# Patient Record
Sex: FEMALE | URBAN - METROPOLITAN AREA
[De-identification: names, ages, dates, MRNs, and addresses within clinical notes are randomized per-mention and may not be internally consistent; named-entity substitution may affect disease eponyms.]

---

## 2020-08-17 ENCOUNTER — HOSPITAL ENCOUNTER (OUTPATIENT)
Dept: PHYSICAL THERAPY | Age: 60
Setting detail: THERAPIES SERIES
Discharge: HOME | End: 2020-08-17
Payer: COMMERCIAL

## 2020-08-17 PROCEDURE — 97161 PT EVAL LOW COMPLEX 20 MIN: CPT

## 2020-08-17 ASSESSMENT — PAIN SCALES - GENERAL: PAINLEVEL_OUTOF10: 2

## 2020-08-17 NOTE — FLOWSHEET NOTE
Physical Therapy Daily Treatment Note    Date:  2020    Patient Name:  Ronda Guzmán    :  1960  MRN: [id number]  Restrictions/Precautions:    Medical/Treatment Diagnosis Information:  · Diagnosis: lumbar DDD, spondylolisthesis  Insurance/Certification information:  PT Insurance Information: Charli  Physician Information:  Referring Practitioner: Dante Lo  Plan of care signed (Y/N):  Y  Visit# / total visits:       G-Code (if applicable): Modified Oswestry 30%      Time in:   2:30      Timed Treatment: 0  Total Treatment Time:  60  Time out: 3:30  ________________________________________________________________________________________    Pain Level:    /10  SUBJECTIVE:  See initial eval    OBJECTIVE:     Exercise/Equipment Resistance/Repetitions Other comments   TA activation with BP cuff NV    Clamshells  NV    Bridging NV    Stacking with MB NV    lumbopelvic stabilization   NV                                                                                                           Other Therapeutic Activities:      Manual Treatments: B LE pulling NV. QL STM with manual stretch followed by lumbar TP SB mobs. Grade 3-4 lumbar gapping. Thoracic distraction. Tracing L5-S1 nerve lower L LE.           Modalities:      Test/Measurements:         ASSESSMENT:         Treatment/Activity Tolerance:   []Patient tolerated treatment well [] Patient limited by fatique  []Patient limited by pain [] Patient limited by other medical complications  [] Other:     Goals:          Long term goals  Time Frame for Long term goals : 6 weeks  Long term goal 1: Pt independent with HEP  Long term goal 2: Pt improve muscle strength by 1/3 muscle grade  Long term goal 3: Pt able to walk 30 mins without limits to pain  Long term goal 4: Pt able to do house/yard work without limits to pain  Long term goal 5: Pt return to 100% PLOF     Plan: [] Continue per plan of care [] Alter current plan (see comments)   [x] Plan of care initiated [] Hold pending MD visit [] Discharge      Plan for Next Session:      Re-Certification Due Date:         Signature:  Kendell Sibley PT

## 2020-08-17 NOTE — PROGRESS NOTES
Physical Therapy  Initial Assessment  Date: 2020  Patient Name: Mukesh Dominguez  MRN: [id number]  : 1960     Restrictions  Position Activity Restriction  Other position/activity restrictions: No lifting and no yardwork. Subjective   General  Chart Reviewed: Yes  Family / Caregiver Present: No  Referring Practitioner: Gato Horne  Referral Date : 20  Diagnosis: lumbar DDD, spondylolisthesis  Follows Commands: Within Functional Limits  General Comment  Comments: PLOF: completely independent. currently 60% PLOF. Goals: not limited with standing, walking, housework or yardwork. PT Visit Information  PT Insurance Information: Charli  Subjective  Subjective: Pt reports having injections in friday in B hips with good outcomes. Dr Trenton Sutton with spinal stenosis that has gotten worse over the past couple years. Standing limited to 1 hr before limited to pain in hips more than back and standing is the worst for pain. when sitting to standing pain subsides quickly. Housework also increases pain. Doc said lifting restrictions and no yardwork. Sitting greater than 1 hour causes N/T on the lateral L lower leg. pain reports same B. denies bowel/bladder. low back MRI + spinal stenosis xray + for bursitis. sleeping/laying on L side is more comfortable than the other. Walking limited to 20 minutes before pain, golfing is okay but pain sets in in last few holes. no noted weakness B LEs. Pain Screening  Patient Currently in Pain: Yes  Pain Assessment  Pain Assessment: 0-10  Pain Level: 2(at worst 7-8/10 in back and hips.)  Vital Signs  Patient Currently in Pain: Yes    Objective     Observation/Palpation  Posture: Fair  Palpation: Tenderness B PSIS  Observation: Standing: L pes planus and ankle valgus, L knee hyperextension, L hip forward rotation. Hinge at L3. B scapular AT, IR, depression and downward rotation  Body Mechanics: Squat: B knee valgus, quad dominance, R weight shift, B hip IR.  L SLS: ipsilateral lateral trunk shift. R SLS: trendelenburg. Single leg squat: B hip IR, knee valgus, and trendelenburg with R worse than L. Gait: lumbar ERS, increased pes planus B with L worse than R, B hip IR, L heel whip. PROM RLE (degrees)  RLE PROM: WNL  PROM LLE (degrees)  LLE PROM: WNL  Spine  Lumbar: Hypomobility L3/L4 and L4/L5  Joint Mobility  Spine: full AROM lumbar spine with pain in FF, B SB and extension at L5 level. Strength RLE  Strength RLE: WNL  Comment: Hip ER 4/5; Hip IR 4/5 PGM 3-/5  Strength LLE  Strength LLE: WNL  Comment: Hip ER 4-/5; Hip IR 4/5 PGM 3-/5     Additional Measures  Other: Sensation to pin prick WNL B LEs. DTR WNL B LE. Denies B/B.  (-) babinski, (-) clonus. (-) slump test B. Assessment   Conditions Requiring Skilled Therapeutic Intervention  Body structures, Functions, Activity limitations: Decreased functional mobility ; Increased pain;Decreased posture;Decreased ROM; Decreased strength  Assessment: Pt presents with movement impairment lumbar ERS when walking. Lack of gluteal and core activation in standing and in gait increase stress on ligamentous structures and posterior vertebrae. Pt presents with a lumbar hinge at L3 and hypomobile L3/L4 segments.   Prognosis: Good  Decision Making: Low Complexity  REQUIRES PT FOLLOW UP: Yes         Plan   Plan  Times per week: 2x/week for 6 weeks  Current Treatment Recommendations: Strengthening, IADL Training, Neuromuscular Re-education, Home Exercise Program, ROM, Manual Therapy - Soft Tissue Mobilization, Endurance Training, Patient/Caregiver Education & Training, Gait Training, Manual Therapy - Joint Manipulation, Functional Mobility Training, ADL/Self-care Training, Pain Management    G-Code   Modified Oswestry  30%    Goals  Long term goals  Time Frame for Long term goals : 6 weeks  Long term goal 1: Pt independent with HEP  Long term goal 2: Pt improve muscle strength by 1/3 muscle grade  Long term goal 3: Pt able to walk 30 mins without limits to pain  Long term goal 4: Pt able to do house/yard work without limits to pain  Long term goal 5: Pt return to 100% PLOF  Patient Goals   Patient goals : 100% PLOF, able to walk 30 minutes without limits to pain. able to do house/yard work without limits to pain, able to stand greater than 1 hr without limits to pain.        Therapy Time   Individual Concurrent Group Co-treatment   Time In  2:30         Time Out 3:30         Minutes 0                 Charli Mini, PT

## 2020-08-21 ENCOUNTER — HOSPITAL ENCOUNTER (OUTPATIENT)
Dept: PHYSICAL THERAPY | Age: 60
Setting detail: THERAPIES SERIES
Discharge: HOME | End: 2020-08-21
Payer: COMMERCIAL

## 2020-08-21 PROCEDURE — 97110 THERAPEUTIC EXERCISES: CPT

## 2020-08-21 PROCEDURE — 97140 MANUAL THERAPY 1/> REGIONS: CPT

## 2020-08-21 NOTE — FLOWSHEET NOTE
Physical Therapy Daily Treatment Note    Date:  2020    Patient Name:  Jo Ann Patterson    :  1960  MRN: [id number]  Restrictions/Precautions:    Medical/Treatment Diagnosis Information:  · Diagnosis: lumbar DDD, spondylolisthesis  Insurance/Certification information:  PT Insurance Information: Charli  Physician Information:  Referring Practitioner: Neville Vance  Plan of care signed (Y/N):  Y  Visit# / total visits:       G-Code (if applicable): Modified Oswestry 30%      Time in:   1:45     Timed Treatment: 45  Total Treatment Time:  45  Time out: 2:30  ________________________________________________________________________________________    Pain Level:    3/10  SUBJECTIVE:  Pt states that she has been focusing on adjusting posture since last visit and notices she is able to stand longer. OBJECTIVE:     Exercise/Equipment Resistance/Repetitions Other comments   TA activation with BP cuff x5 mins    Clamshells  6x5 sec HEP   Bridging [id number] HEP   Stacking with MB NV    lumbopelvic stabilization   x5 mins                                                                                                           Other Therapeutic Activities:      Manual Treatments: B LE pulling. QL STM with manual stretch followed by lumbar TP SB mobs. Grade 2-3 general L lumbar gapping. Grade 3-4 L lumbar gapping with and without cavitation. Thoracic distraction gr 5 with cavitation. Tracing L5-S1 nerve lower L LE.   Grade 4 fibular head mob. Gr 4 L tibial IR mobs followed by gr 4 M/L tibio-femoral joint mobs. Sural nerve tracing and flossing L LE. Modalities:    Decreased L tibial IR  Hypomobility L fibular head     Test/Measurements:         ASSESSMENT:  Pt responded well to manual therapy with decreased pain and increased ROM. Initiated core and gluteal strengthening this visit and pt tolerated exercises well.  Continue to progress stabilization exercises and initiate standing stabilization exercises NV.         Treatment/Activity Tolerance:   [x]Patient tolerated treatment well [] Patient limited by fatique  []Patient limited by pain [] Patient limited by other medical complications  [] Other:     Goals:          Long term goals  Time Frame for Long term goals : 6 weeks  Long term goal 1: Pt independent with HEP  Long term goal 2: Pt improve muscle strength by 1/3 muscle grade  Long term goal 3: Pt able to walk 30 mins without limits to pain  Long term goal 4: Pt able to do house/yard work without limits to pain  Long term goal 5: Pt return to 100% PLOF     Plan: [x] Continue per plan of care [] Alter current plan (see comments)   [] Plan of care initiated [] Hold pending MD visit [] Discharge      Plan for Next Session:      Re-Certification Due Date:         Signature:  Gibran Mckeon PT

## 2020-08-26 ENCOUNTER — HOSPITAL ENCOUNTER (OUTPATIENT)
Dept: PHYSICAL THERAPY | Age: 60
Setting detail: THERAPIES SERIES
Discharge: HOME | End: 2020-08-26
Payer: COMMERCIAL

## 2020-08-26 PROCEDURE — 97110 THERAPEUTIC EXERCISES: CPT

## 2020-08-26 PROCEDURE — 97140 MANUAL THERAPY 1/> REGIONS: CPT

## 2020-08-26 NOTE — FLOWSHEET NOTE
Physical Therapy Daily Treatment Note    Date:  2020    Patient Name:  Barbara Stephens    :  1960  MRN: [id number]  Restrictions/Precautions:    Medical/Treatment Diagnosis Information:  · Diagnosis: lumbar DDD, spondylolisthesis  Insurance/Certification information:  PT Insurance Information: Charli  Physician Information:  Referring Practitioner: Ivan Ladd  Plan of care signed (Y/N):  Y  Visit# / total visits:  3/12     G-Code (if applicable): Modified Oswestry 30%      Time in:   12:58    Timed Treatment: 47  Total Treatment Time:  47  Time out: 1:45  ________________________________________________________________________________________    Pain Level:    1-210  SUBJECTIVE:  Pt states feeling better after last session. Good consistency with HEP since then but golfed 9 holes in the am and then 9 holes in the afternoon. Soreness today in the low back. OBJECTIVE:     Exercise/Equipment Resistance/Repetitions Other comments   TA activation with BP cuff        With march         With KFO  Until achieved  x10 B   x10 B  HEP video    Clamshells 2 6x5 sec HEP   Bridging [id number] HEP   Stacking with MB 2x30 secs B    lumbopelvic stabilization   x5 mins                                TG      x6 mins                                                                       Other Therapeutic Activities:      Manual Treatments: B LE pulling. QL STM with manual stretch followed by lumbar TP SB mobs. Grade 2-3 general L lumbar gapping. Grade 3-4 L lumbar gapping with and without cavitation. Thoracic distraction gr 5 with cavitation. Tracing L5-S1 nerve lower L LE.   Grade 4 fibular head mob. Gr 4 L tibial IR mobs followed by gr 4 M/L tibio-femoral joint mobs. Sural nerve tracing and flossing L LE. Modalities:    Decreased L tibial IR  LLD L longer than R   R AI     Test/Measurements:         ASSESSMENT:  Pt responded well to manual therapy with decreased pain and increased ROM.

## 2020-08-28 ENCOUNTER — HOSPITAL ENCOUNTER (OUTPATIENT)
Dept: PHYSICAL THERAPY | Age: 60
Setting detail: THERAPIES SERIES
Discharge: HOME | End: 2020-08-28
Payer: COMMERCIAL

## 2020-08-28 PROCEDURE — 97110 THERAPEUTIC EXERCISES: CPT

## 2020-08-28 PROCEDURE — 97112 NEUROMUSCULAR REEDUCATION: CPT

## 2020-08-28 NOTE — FLOWSHEET NOTE
Physical Therapy Daily Treatment Note    Date:  2020    Patient Name:  Merlin Patterson    :  1960  MRN: [id number]  Restrictions/Precautions:    Medical/Treatment Diagnosis Information:  · Diagnosis: lumbar DDD, spondylolisthesis  Insurance/Certification information:  PT Insurance Information: Charli  Physician Information:  Referring Practitioner: Jeanette Barrett  Plan of care signed (Y/N):  Y  Visit# / total visits:       G-Code (if applicable): Modified Oswestry 30%      Time in:   1:00   Timed Treatment: 45  Total Treatment Time:  45  Time out: 1:45  ________________________________________________________________________________________    Pain Level:    2-3/10  SUBJECTIVE:  Pt states feeling good with only mild discomfort after playing 18 holes of golf yesterday. OBJECTIVE:     Exercise/Equipment Resistance/Repetitions Other comments   TA activation with BP cuff        With march         With KFO   HEP video    Clamshells 2  HEP   Bridging  HEP   Stacking with MB 2x30 secs B    lumbopelvic stabilization       Lunges with anterior anchor   x15 orange SC     Standing S' extension   x15 maroon TB     Multifidus  x15 maroon TB     Rockerboard  x1 min F/B M/L  x1 min rocking each     TG      x6 mins             Hip abd     Hip ext 15# 2x15 B   40# x15 B            Standing hip ER with green TB and S' horiz abd x10 B with blue TB                                          Other Therapeutic Activities:      Manual Treatments:       Modalities:    Decreased L tibial IR  LLD L longer than R   R AI     Test/Measurements:         ASSESSMENT:   Progressed core and gluteal strengthening in standing this visit.         Treatment/Activity Tolerance:   [x]Patient tolerated treatment well [] Patient limited by fatique  []Patient limited by pain [] Patient limited by other medical complications  [] Other:     Goals:        Long term goals  Time Frame for Long term goals : 6 weeks  Long term goal

## 2020-08-31 ENCOUNTER — HOSPITAL ENCOUNTER (OUTPATIENT)
Dept: PHYSICAL THERAPY | Age: 60
Setting detail: THERAPIES SERIES
Discharge: HOME | End: 2020-08-31
Payer: COMMERCIAL

## 2020-08-31 PROCEDURE — 97110 THERAPEUTIC EXERCISES: CPT

## 2020-08-31 PROCEDURE — 97112 NEUROMUSCULAR REEDUCATION: CPT

## 2020-08-31 NOTE — FLOWSHEET NOTE
Physical Therapy Daily Treatment Note    Date:  2020    Patient Name:  Eugenio Gonzalez    :  1960  MRN: [id number]  Restrictions/Precautions:    Medical/Treatment Diagnosis Information:  · Diagnosis: lumbar DDD, spondylolisthesis  Insurance/Certification information:  PT Insurance Information: Charli  Physician Information:  Referring Practitioner: Alex Garrison  Plan of care signed (Y/N):  Y  Visit# / total visits:       G-Code (if applicable): Modified Oswestry 30%    Time in:   1:00   Timed Treatment: 45  Total Treatment Time:  45  Time out: 1:45  ________________________________________________________________________________________    Pain Level:    1/10  SUBJECTIVE:  Pt states she is feeling much better without pain over the weekend. OBJECTIVE:     Exercise/Equipment Resistance/Repetitions Other comments   TA activation with BP cuff        With march         With KFO   HEP video    Clamshells 2  HEP   Bridging  HEP   Stacking with MB 2x30 secs B    lumbopelvic stabilization       Lunges with anterior anchor   x15 orange SC     Standing S' extension with Hip ER x15 maroon TB  Green TB    Multifidus with Hip ER  x15 maroon TB  Green TB    Rockerboard  x1 min F/B M/L  x1 min rocking each     TG      x6 mins     Posterior sling     x15 B maroon TB 4\" step    Lateral sling x15 B maroon TB     Hip abd     Hip ext 20# 2x15 B   45# x15 B            Standing hip ER with green TB and S' horiz abd x10 B with blue TB     Lateral stepping with grey TB        Hip abd steamboats   6x10 ft  x15 B                                  Other Therapeutic Activities:      Manual Treatments:       Modalities:        Test/Measurements:         ASSESSMENT:   Progressed core and gluteal strengthening in standing this visit.         Treatment/Activity Tolerance:   [x]Patient tolerated treatment well [] Patient limited by fatique  []Patient limited by pain [] Patient limited by other medical complications  [] Other:     Goals:        Long term goals  Time Frame for Long term goals : 6 weeks  Long term goal 1: Pt independent with HEP  Long term goal 2: Pt improve muscle strength by 1/3 muscle grade  Long term goal 3: Pt able to walk 30 mins without limits to pain  Long term goal 4: Pt able to do house/yard work without limits to pain  Long term goal 5: Pt return to 100% PLOF     Plan: [x] Continue per plan of care [] Alter current plan (see comments)   [] Plan of care initiated [] Hold pending MD visit [] Discharge      Plan for Next Session:      Re-Certification Due Date:         Signature:  Reji Tavarez , PT

## 2020-09-03 ENCOUNTER — HOSPITAL ENCOUNTER (OUTPATIENT)
Dept: PHYSICAL THERAPY | Age: 60
Setting detail: THERAPIES SERIES
Discharge: HOME | End: 2020-09-03
Payer: COMMERCIAL

## 2020-09-03 PROCEDURE — 97112 NEUROMUSCULAR REEDUCATION: CPT

## 2020-09-03 PROCEDURE — 97110 THERAPEUTIC EXERCISES: CPT

## 2020-09-03 NOTE — FLOWSHEET NOTE
Physical Therapy Daily Treatment Note    Date:  9/3/2020    Patient Name:  Ronda Guzmán    :  1960  MRN: [id number]  Restrictions/Precautions:    Medical/Treatment Diagnosis Information:  · Diagnosis: lumbar DDD, spondylolisthesis  Insurance/Certification information:  PT Insurance Information: Charli  Physician Information:  Referring Practitioner: Dante Lo  Plan of care signed (Y/N):  Y  Visit# / total visits:       G-Code (if applicable): Modified Oswestry     9/3/20  12% disability    At initial evaluation 30%    Time in:   12:45   Timed Treatment: 45  Total Treatment Time:  45  Time out: 1:30  ________________________________________________________________________________________    Pain Level:    1/10  SUBJECTIVE:  Pt states she is feeling much better without pain over the weekend. Pt has one visit next week then off PT for 2 weeks. OBJECTIVE:     Exercise/Equipment Resistance/Repetitions Other comments   TA activation with BP cuff        With march         With KFO   HEP video    Clamshells 2  HEP   Bridging  HEP   Stacking with MB     lumbopelvic stabilization       Lunges with anterior anchor   x15 yellow SC     Standing S' extension with Hip ER  Green TB    Multifidus with Hip ER   Green TB    Rockerboard  x1 min F/B M/L  x1 min rocking each     TG      x6 mins     Posterior sling     x15 B maroon TB 4\" step    Lateral sling x15 B maroon TB     Hip abd     Hip ext 20#    45#    3 way hip with SC    x15 B     Standing hip ER with green TB and S' horiz abd      Lateral stepping with grey TB        Hip abd steamboats       Resisted walking with SC   x8 laps each direction [place] SC           [facility] standing EC        minisquat   x1 min   x10              Other Therapeutic Activities:      Manual Treatments:       Modalities:        Test/Measurements:         ASSESSMENT:   Progressed core and gluteal strengthening in standing this visit.         Treatment/Activity Tolerance:   [x]Patient tolerated treatment well [] Patient limited by fatique  []Patient limited by pain [] Patient limited by other medical complications  [] Other:     Goals:        Long term goals  Time Frame for Long term goals : 6 weeks  Long term goal 1: Pt independent with HEP  Long term goal 2: Pt improve muscle strength by 1/3 muscle grade  Long term goal 3: Pt able to walk 30 mins without limits to pain  Long term goal 4: Pt able to do house/yard work without limits to pain  Long term goal 5: Pt return to 100% PLOF     Plan: [x] Continue per plan of care [] Alter current plan (see comments)   [] Plan of care initiated [] Hold pending MD visit [] Discharge      Plan for Next Session:      Re-Certification Due Date:         Signature:  Kayce Curiel PT

## 2020-09-09 ENCOUNTER — HOSPITAL ENCOUNTER (OUTPATIENT)
Dept: PHYSICAL THERAPY | Age: 60
Setting detail: THERAPIES SERIES
Discharge: HOME | End: 2020-09-09
Payer: COMMERCIAL

## 2020-09-09 PROCEDURE — 97112 NEUROMUSCULAR REEDUCATION: CPT

## 2020-09-09 PROCEDURE — 97110 THERAPEUTIC EXERCISES: CPT

## 2020-09-09 NOTE — FLOWSHEET NOTE
Physical Therapy Daily Treatment Note    Date:  2020    Patient Name:  Cait Osman    :  1960  MRN: [id number]  Restrictions/Precautions:    Medical/Treatment Diagnosis Information:  · Diagnosis: lumbar DDD, spondylolisthesis  Insurance/Certification information:  PT Insurance Information: Charli  Physician Information:  Referring Practitioner: Salvador Patient  Plan of care signed (Y/N):  Y  Visit# / total visits:       G-Code (if applicable): Modified Oswestry     9/3/20  12% disability    At initial evaluation 30%    Time in:   1:00   Timed Treatment: 45  Total Treatment Time:  45  Time out: 1:45  ________________________________________________________________________________________    Pain Level:    0/10  SUBJECTIVE:  Pt states she is feeling much better without pain over the weekend. Pt states she has been golfing without pain. Pt holding therapy over the next couple of weeks for vacation.       OBJECTIVE:     Exercise/Equipment Resistance/Repetitions Other comments   TA activation with BP cuff        With march         With KFO   HEP video    Clamshells 2  HEP   Bridging  HEP   Stacking with MB     lumbopelvic stabilization       SLS with anterior anchor   4x15 secs B yellow SC  Mirror cueing   Standing S' extension with Hip ER  Green TB    Multifidus walkouts with green TB around ankles  x10 B     Rockerboard  x1 min F/B M/L  x1 min rocking each     TG      x6 mins     Low trap with yellow TB and hip ER with green TB  x15    Posterior sling         Lateral sling     Hip abd     Hip ext 20#    45#    3 way hip with SC    x15 B     Standing hip ER with green TB and S' horiz abd      Lateral stepping with grey TB        Hip abd steamboats       Resisted walking with SC   x8 laps each direction [place] SC    S' horiz abd with posterior anchor x15 B grey TB  [place] maroon TB    S' extension with posterior anchor x15 maroon TB  [place] maroon TB   [facility] standing EC        minisquat   x1 min   x10              Other Therapeutic Activities:      Manual Treatments:      Modalities:      Test/Measurements:         ASSESSMENT:   Progressed dynamic core and gluteal strengthening in standing this visit.         Treatment/Activity Tolerance:   [x]Patient tolerated treatment well [] Patient limited by fatique  []Patient limited by pain [] Patient limited by other medical complications  [] Other:     Goals:        Long term goals  Time Frame for Long term goals : 6 weeks  Long term goal 1: Pt independent with HEP  Long term goal 2: Pt improve muscle strength by 1/3 muscle grade  Long term goal 3: Pt able to walk 30 mins without limits to pain  Long term goal 4: Pt able to do house/yard work without limits to pain  Long term goal 5: Pt return to 100% PLOF     Plan: [x] Continue per plan of care [] Alter current plan (see comments)   [] Plan of care initiated [] Hold pending MD visit [] Discharge      Plan for Next Session:      Re-Certification Due Date:         Signature:  Bárbara Ansari , PT

## 2020-09-11 ENCOUNTER — HOSPITAL ENCOUNTER (OUTPATIENT)
Dept: PHYSICAL THERAPY | Age: 60
Setting detail: THERAPIES SERIES
Discharge: HOME | End: 2020-09-11
Payer: COMMERCIAL

## 2020-09-11 PROCEDURE — 97112 NEUROMUSCULAR REEDUCATION: CPT

## 2020-09-11 PROCEDURE — 97110 THERAPEUTIC EXERCISES: CPT

## 2020-09-11 NOTE — FLOWSHEET NOTE
Physical Therapy Daily Treatment Note    Date:  2020    Patient Name:  Imelda Sullivan    :  1960  MRN: [id number]  Restrictions/Precautions:    Medical/Treatment Diagnosis Information:  · Diagnosis: lumbar DDD, spondylolisthesis  Insurance/Certification information:  PT Insurance Information: Charli  Physician Information:  Referring Practitioner: Gavin Pineda  Plan of care signed (Y/N):  Y  Visit# / total visits:       G-Code (if applicable): Modified Oswestry     9/3/20  12% disability    At initial evaluation 30%    Time in:   11:30   Timed Treatment: 45  Total Treatment Time:  45  Time out: 12:15  ________________________________________________________________________________________    Pain Level:    0/10  SUBJECTIVE:  Pt states she is feeling much better without pain over the weekend. Pt holding therapy over the next couple of weeks for vacation.       OBJECTIVE:     Exercise/Equipment Resistance/Repetitions Other comments   TA activation with BP cuff        With march         With KFO   HEP video    Clamshells 2  HEP   Bridging  HEP   Stacking with MB     lumbopelvic stabilization       SLS with anterior anchor   4x15 secs B yellow SC  Mirror cueing   Standing S' extension with Hip ER  Green TB    Multifidus walkouts with green TB around ankles       Rockerboard      TG      x6 mins     Low trap with yellow TB and hip ER with green TB  x15    Posterior sling         Lateral sling     Hip abd     Hip ext 20#    45#    3 way hip with SC    x15 B     Standing hip ER with green TB and S' horiz abd      Lateral stepping with grey TB        Hip abd steamboats       Resisted walking with SC   x8 laps each direction [facility]    S' horiz abd with posterior anchor x15 B grey TB  [employer] maroon TB    S' extension with posterior anchor x15 maroon TB  [employer] maroon TB   BOSU standing EC        minisquat         SLS  x1 min   x15  x1 min B     Lateral sling x15 B maroon TB     Posterior sling    x15 B maroon TB 8\" step      Other Therapeutic Activities:      Manual Treatments:      Modalities:      Test/Measurements:         ASSESSMENT:   Progressed dynamic core and gluteal strengthening in standing this visit.         Treatment/Activity Tolerance:   [x]Patient tolerated treatment well [] Patient limited by fatique  []Patient limited by pain [] Patient limited by other medical complications  [] Other:     Goals:        Long term goals  Time Frame for Long term goals : 6 weeks  Long term goal 1: Pt independent with HEP  Long term goal 2: Pt improve muscle strength by 1/3 muscle grade  Long term goal 3: Pt able to walk 30 mins without limits to pain  Long term goal 4: Pt able to do house/yard work without limits to pain  Long term goal 5: Pt return to 100% PLOF     Plan: [x] Continue per plan of care [] Alter current plan (see comments)   [] Plan of care initiated [] Hold pending MD visit [] Discharge      Plan for Next Session:      Re-Certification Due Date:         Signature:  Paige George PT

## 2020-09-14 ENCOUNTER — APPOINTMENT (OUTPATIENT)
Dept: PHYSICAL THERAPY | Age: 60
End: 2020-09-14
Payer: COMMERCIAL

## 2020-09-17 ENCOUNTER — APPOINTMENT (OUTPATIENT)
Dept: PHYSICAL THERAPY | Age: 60
End: 2020-09-17
Payer: COMMERCIAL

## 2020-09-28 ENCOUNTER — HOSPITAL ENCOUNTER (OUTPATIENT)
Dept: PHYSICAL THERAPY | Age: 60
Setting detail: THERAPIES SERIES
Discharge: HOME | End: 2020-09-28
Payer: COMMERCIAL

## 2020-09-28 PROCEDURE — 97140 MANUAL THERAPY 1/> REGIONS: CPT

## 2020-09-28 PROCEDURE — 97110 THERAPEUTIC EXERCISES: CPT

## 2020-09-28 NOTE — FLOWSHEET NOTE
Physical Therapy Daily Treatment Note    Date:  2020    Patient Name:  Austin Ferreira    :  1960  MRN: [id number]  Restrictions/Precautions:    Medical/Treatment Diagnosis Information:  · Diagnosis: lumbar DDD, spondylolisthesis  Insurance/Certification information:  PT Insurance Information: Charli  Physician Information:  Referring Practitioner: Garbiel Wood  Plan of care signed (Y/N):  Y  Visit# / total visits:       G-Code (if applicable): Modified Oswestry     9/3/20  12% disability    At initial evaluation 30%    Time in:   11:30   Timed Treatment: 45  Total Treatment Time:  45  Time out: 12:15  ________________________________________________________________________________________    Pain Level:    0/10  SUBJECTIVE:  Pt states she is feeling much better without pain over the weekend. Pt holding therapy over the next couple of weeks for vacation.       OBJECTIVE:     Exercise/Equipment Resistance/Repetitions Other comments   TA activation with BP cuff        With march         With KFO   HEP video    Clamshells 2  HEP   Bridging  HEP   Stacking with MB     lumbopelvic stabilization       SLS with anterior anchor   4x15 secs B yellow SC  Mirror cueing   Standing S' extension with Hip ER  Green TB    Multifidus walkouts with green TB around ankles       Rockerboard      TG      x6 mins     Low trap with yellow TB and hip ER with green TB      Posterior sling         Lateral sling     Hip abd     Hip ext 20#    45#    3 way hip with SC         Standing hip ER with green TB and S' horiz abd      Lateral stepping with grey TB        Hip abd steamboats       Resisted walking with SC    [place] SC    Resisted lateral step up 4\" x15 B [place] SC   Resisted step up 4\" x15 B [place] SC   S' horiz abd with posterior anchor x15 B grey TB  [place] maroon TB    S' extension with posterior anchor x15 maroon TB  [place] maroon TB   BOSU standing EC        minisquat         SLS  x1 min     x1 min B Lateral sling on foam x15 B maroon TB     Posterior sling on foam  x15 B maroon TB 6\" step      Other Therapeutic Activities:      Manual Treatments:      Modalities:      Test/Measurements:         ASSESSMENT:   Progressed dynamic core and gluteal strengthening in standing this visit. Pt exhibited increased lumbo-pelvic control with balance activities.         Treatment/Activity Tolerance:   [x]Patient tolerated treatment well [] Patient limited by fatique  []Patient limited by pain [] Patient limited by other medical complications  [] Other:     Goals:        Long term goals  Time Frame for Long term goals : 6 weeks  Long term goal 1: Pt independent with HEP  Long term goal 2: Pt improve muscle strength by 1/3 muscle grade  Long term goal 3: Pt able to walk 30 mins without limits to pain  Long term goal 4: Pt able to do house/yard work without limits to pain  Long term goal 5: Pt return to 100% PLOF     Plan: [x] Continue per plan of care [] Alter current plan (see comments)   [] Plan of care initiated [] Hold pending MD visit [] Discharge      Plan for Next Session:      Re-Certification Due Date:         Signature:  Yessy Harper PT

## 2020-09-30 ENCOUNTER — HOSPITAL ENCOUNTER (OUTPATIENT)
Dept: PHYSICAL THERAPY | Age: 60
Setting detail: THERAPIES SERIES
Discharge: HOME | End: 2020-09-30
Payer: COMMERCIAL

## 2020-09-30 PROCEDURE — 97140 MANUAL THERAPY 1/> REGIONS: CPT

## 2020-09-30 PROCEDURE — 97110 THERAPEUTIC EXERCISES: CPT

## 2020-09-30 NOTE — FLOWSHEET NOTE
Physical Therapy Daily Treatment Note    Date:  2020    Patient Name:  Darcia Bloch    :  1960  MRN: [id number]  Restrictions/Precautions:    Medical/Treatment Diagnosis Information:  · Diagnosis: lumbar DDD, spondylolisthesis  Insurance/Certification information:  PT Insurance Information: Charli  Physician Information:  Referring Practitioner: Yahir Noriega  Plan of care signed (Y/N):  Y  Visit# / total visits:  10/12     G-Code (if applicable): Modified Oswestry    20 14% disability      9/3/20  12% disability    At initial evaluation 30%    Time in:   11:25   Timed Treatment: 45  Total Treatment Time:  45  Time out: 12:10  ________________________________________________________________________________________    Pain Level:    0/10  SUBJECTIVE:  Pt reports mild increased in LB pain over the past few days. Injection 6 weeks ago.         OBJECTIVE:     Exercise/Equipment Resistance/Repetitions Other comments   TA activation with BP cuff        With march         With KFO   HEP video    Clamshells 2  HEP   Bridging  HEP   Press up to prayer stretch x10  HEP    Stacking with MB     lumbopelvic stabilization       SLS with anterior anchor    Mirror cueing   Standing S' extension with Hip ER  Green TB    Multifidus walkouts with green TB around ankles       Planks x5 mins total 3 way HEP    TG      x6 mins     Low trap with yellow TB and hip ER with green TB      Posterior sling         Lateral sling     Hip abd     Hip ext 20#  2x15  45#     3 way hip with SC         Standing hip ER with green TB and S' horiz abd      Lateral stepping with grey TB        Hip abd steamboats       Resisted walking with SC    [place] SC    Resisted lateral step up 4\"  [place] SC   Standing Ys with hip add  Standing Ts with hip add  x15 yellow TB   x15 yellow TB     Resisted step up 4\"  [place] SC   S' horiz abd  x15 B grey TB  [place] maroon TB    S' extension x15 maroon TB  [place] maroon TB   BOSU standing EC        minisquat         SLS      Lateral sling on foam     Posterior sling on foam        Other Therapeutic Activities:  Morning core routine x8 mins with handout. Manual Treatments:  Gr 3-4 R lumbar gapping without cavitation followed by STM R QL and manual stretching. B LE pulling. S3 mob gr 4 sustained through 3 breaths. MWM sacral nutation with prone press up. Modalities:      Test/Measurements:         ASSESSMENT:   Pt has progressed well with PT with significant functional gains and decreased pain. Pt has returned to playing golf without pain and PLOF. Pt hasn't had PT for 2 weeks and notes mild increased in LB more on the R side. Not significant but concerned pain will return. Pt had injection 6 weeks ago. Pt responded well to manual therapy today with decreased pain to 0/10. Recommend holding PT at this time and treat PRN over the next few weeks. There are 2 visits remaining on this prescription.         Treatment/Activity Tolerance:   [x]Patient tolerated treatment well [] Patient limited by fatique  []Patient limited by pain [] Patient limited by other medical complications  [] Other:     Goals:        Long term goals  Time Frame for Long term goals : 6 weeks  Long term goal 1: Pt independent with HEP  Long term goal 2: Pt improve muscle strength by 1/3 muscle grade  Long term goal 3: Pt able to walk 30 mins without limits to pain  Long term goal 4: Pt able to do house/yard work without limits to pain  Long term goal 5: Pt return to 100% PLOF     Plan: [x] Continue per plan of care [] Alter current plan (see comments)   [] Plan of care initiated [] Hold pending MD visit [] Discharge      Plan for Next Session:      Re-Certification Due Date:         Signature:  Wendie Mccullough , CARLOS

## 2023-08-30 ENCOUNTER — HOSPITAL ENCOUNTER (INPATIENT)
Age: 63
LOS: 1 days | Discharge: HOME | End: 2023-08-31
Attending: EMERGENCY MEDICINE | Admitting: INTERNAL MEDICINE
Payer: COMMERCIAL

## 2023-08-30 ENCOUNTER — APPOINTMENT (OUTPATIENT)
Dept: GENERAL RADIOLOGY | Age: 63
End: 2023-08-30

## 2023-08-30 DIAGNOSIS — I47.1 PAROXYSMAL SUPRAVENTRICULAR TACHYCARDIA (HCC): Primary | ICD-10-CM

## 2023-08-30 DIAGNOSIS — E83.42 HYPOMAGNESEMIA: ICD-10-CM

## 2023-08-30 DIAGNOSIS — E87.6 HYPOKALEMIA: ICD-10-CM

## 2023-08-30 PROBLEM — I47.10 SVT (SUPRAVENTRICULAR TACHYCARDIA): Status: ACTIVE | Noted: 2023-08-30

## 2023-08-30 LAB
ALBUMIN SERPL-MCNC: 4.1 G/DL (ref 3.4–5)
ALBUMIN/GLOB SERPL: 1.4 {RATIO} (ref 1.1–2.2)
ALP SERPL-CCNC: 73 U/L (ref 40–129)
ALT SERPL-CCNC: 7 U/L (ref 10–40)
ANION GAP SERPL CALCULATED.3IONS-SCNC: 19 MMOL/L (ref 3–16)
AST SERPL-CCNC: 18 U/L (ref 15–37)
BASOPHILS # BLD: 0.1 K/UL (ref 0–0.2)
BASOPHILS NFR BLD: 0.9 %
BILIRUB SERPL-MCNC: 0.5 MG/DL (ref 0–1)
BILIRUB UR QL STRIP.AUTO: NEGATIVE
BUN SERPL-MCNC: 13 MG/DL (ref 7–20)
CALCIUM SERPL-MCNC: 8.8 MG/DL (ref 8.3–10.6)
CHLORIDE SERPL-SCNC: 101 MMOL/L (ref 99–110)
CLARITY UR: CLEAR
CO2 SERPL-SCNC: 17 MMOL/L (ref 21–32)
COLOR UR: ABNORMAL
CREAT SERPL-MCNC: 1 MG/DL (ref 0.6–1.2)
DEPRECATED RDW RBC AUTO: 14.2 % (ref 12.4–15.4)
EKG ATRIAL RATE: 121 BPM
EKG ATRIAL RATE: 121 BPM
EKG ATRIAL RATE: 142 BPM
EKG DIAGNOSIS: NORMAL
EKG P AXIS: 48 DEGREES
EKG P AXIS: 48 DEGREES
EKG P-R INTERVAL: 104 MS
EKG P-R INTERVAL: 160 MS
EKG P-R INTERVAL: 160 MS
EKG Q-T INTERVAL: 318 MS
EKG Q-T INTERVAL: 318 MS
EKG Q-T INTERVAL: 358 MS
EKG QRS DURATION: 80 MS
EKG QRS DURATION: 82 MS
EKG QRS DURATION: 82 MS
EKG QTC CALCULATION (BAZETT): 451 MS
EKG QTC CALCULATION (BAZETT): 451 MS
EKG QTC CALCULATION (BAZETT): 550 MS
EKG R AXIS: 0 DEGREES
EKG R AXIS: 0 DEGREES
EKG R AXIS: 10 DEGREES
EKG T AXIS: 29 DEGREES
EKG T AXIS: 29 DEGREES
EKG T AXIS: 49 DEGREES
EKG VENTRICULAR RATE: 121 BPM
EKG VENTRICULAR RATE: 121 BPM
EKG VENTRICULAR RATE: 142 BPM
EOSINOPHIL # BLD: 0.1 K/UL (ref 0–0.6)
EOSINOPHIL NFR BLD: 0.6 %
EPI CELLS #/AREA URNS HPF: NORMAL /HPF (ref 0–5)
GFR SERPLBLD CREATININE-BSD FMLA CKD-EPI: >60 ML/MIN/{1.73_M2}
GLUCOSE SERPL-MCNC: 172 MG/DL (ref 70–99)
GLUCOSE UR STRIP.AUTO-MCNC: NEGATIVE MG/DL
HCT VFR BLD AUTO: 39.1 % (ref 36–48)
HGB BLD-MCNC: 13.2 G/DL (ref 12–16)
HGB UR QL STRIP.AUTO: ABNORMAL
HYALINE CASTS #/AREA URNS LPF: NORMAL /LPF (ref 0–2)
INR PPP: 1.08 (ref 0.84–1.16)
KETONES UR STRIP.AUTO-MCNC: NEGATIVE MG/DL
LEUKOCYTE ESTERASE UR QL STRIP.AUTO: ABNORMAL
LYMPHOCYTES # BLD: 2.8 K/UL (ref 1–5.1)
LYMPHOCYTES NFR BLD: 28.2 %
MAGNESIUM SERPL-MCNC: 1.4 MG/DL (ref 1.8–2.4)
MCH RBC QN AUTO: 32.3 PG (ref 26–34)
MCHC RBC AUTO-ENTMCNC: 33.7 G/DL (ref 31–36)
MCV RBC AUTO: 95.8 FL (ref 80–100)
MONOCYTES # BLD: 0.4 K/UL (ref 0–1.3)
MONOCYTES NFR BLD: 3.8 %
NEUTROPHILS # BLD: 6.7 K/UL (ref 1.7–7.7)
NEUTROPHILS NFR BLD: 66.5 %
NITRITE UR QL STRIP.AUTO: NEGATIVE
NT-PROBNP SERPL-MCNC: 132 PG/ML (ref 0–124)
PH UR STRIP.AUTO: 6 [PH] (ref 5–8)
PLATELET # BLD AUTO: 322 K/UL (ref 135–450)
PMV BLD AUTO: 8.1 FL (ref 5–10.5)
POTASSIUM SERPL-SCNC: 3.1 MMOL/L (ref 3.5–5.1)
PROT SERPL-MCNC: 7.1 G/DL (ref 6.4–8.2)
PROT UR STRIP.AUTO-MCNC: NEGATIVE MG/DL
PROTHROMBIN TIME: 14.1 SEC (ref 11.5–14.8)
RBC # BLD AUTO: 4.08 M/UL (ref 4–5.2)
RBC #/AREA URNS HPF: NORMAL /HPF (ref 0–4)
SODIUM SERPL-SCNC: 137 MMOL/L (ref 136–145)
SP GR UR STRIP.AUTO: <=1.005 (ref 1–1.03)
SPECIMEN STATUS: NORMAL
TROPONIN, HIGH SENSITIVITY: 14 NG/L (ref 0–14)
TROPONIN, HIGH SENSITIVITY: 7 NG/L (ref 0–14)
UA COMPLETE W REFLEX CULTURE PNL UR: ABNORMAL
UA DIPSTICK W REFLEX MICRO PNL UR: YES
URN SPEC COLLECT METH UR: ABNORMAL
UROBILINOGEN UR STRIP-ACNC: 0.2 E.U./DL
WBC # BLD AUTO: 10.1 K/UL (ref 4–11)
WBC #/AREA URNS HPF: NORMAL /HPF (ref 0–5)

## 2023-08-30 PROCEDURE — 1200000000 HC SEMI PRIVATE

## 2023-08-30 PROCEDURE — 93010 ELECTROCARDIOGRAM REPORT: CPT | Performed by: INTERNAL MEDICINE

## 2023-08-30 PROCEDURE — 85610 PROTHROMBIN TIME: CPT

## 2023-08-30 PROCEDURE — 96375 TX/PRO/DX INJ NEW DRUG ADDON: CPT

## 2023-08-30 PROCEDURE — 6360000002 HC RX W HCPCS: Performed by: EMERGENCY MEDICINE

## 2023-08-30 PROCEDURE — 83735 ASSAY OF MAGNESIUM: CPT

## 2023-08-30 PROCEDURE — 94761 N-INVAS EAR/PLS OXIMETRY MLT: CPT

## 2023-08-30 PROCEDURE — 81001 URINALYSIS AUTO W/SCOPE: CPT

## 2023-08-30 PROCEDURE — 5A2204Z RESTORATION OF CARDIAC RHYTHM, SINGLE: ICD-10-PCS | Performed by: INTERNAL MEDICINE

## 2023-08-30 PROCEDURE — 93005 ELECTROCARDIOGRAM TRACING: CPT | Performed by: EMERGENCY MEDICINE

## 2023-08-30 PROCEDURE — 71045 X-RAY EXAM CHEST 1 VIEW: CPT

## 2023-08-30 PROCEDURE — 6360000002 HC RX W HCPCS: Performed by: INTERNAL MEDICINE

## 2023-08-30 PROCEDURE — 2500000003 HC RX 250 WO HCPCS: Performed by: EMERGENCY MEDICINE

## 2023-08-30 PROCEDURE — 2580000003 HC RX 258: Performed by: INTERNAL MEDICINE

## 2023-08-30 PROCEDURE — 99285 EMERGENCY DEPT VISIT HI MDM: CPT

## 2023-08-30 PROCEDURE — 96361 HYDRATE IV INFUSION ADD-ON: CPT

## 2023-08-30 PROCEDURE — 85025 COMPLETE CBC W/AUTO DIFF WBC: CPT

## 2023-08-30 PROCEDURE — 93005 ELECTROCARDIOGRAM TRACING: CPT | Performed by: INTERNAL MEDICINE

## 2023-08-30 PROCEDURE — 2700000000 HC OXYGEN THERAPY PER DAY

## 2023-08-30 PROCEDURE — 6360000002 HC RX W HCPCS

## 2023-08-30 PROCEDURE — 6370000000 HC RX 637 (ALT 250 FOR IP): Performed by: INTERNAL MEDICINE

## 2023-08-30 PROCEDURE — 84484 ASSAY OF TROPONIN QUANT: CPT

## 2023-08-30 PROCEDURE — 80053 COMPREHEN METABOLIC PANEL: CPT

## 2023-08-30 PROCEDURE — 83880 ASSAY OF NATRIURETIC PEPTIDE: CPT

## 2023-08-30 PROCEDURE — 2580000003 HC RX 258: Performed by: EMERGENCY MEDICINE

## 2023-08-30 PROCEDURE — 96374 THER/PROPH/DIAG INJ IV PUSH: CPT

## 2023-08-30 RX ORDER — POLYETHYLENE GLYCOL 3350 17 G/17G
17 POWDER, FOR SOLUTION ORAL DAILY PRN
Status: DISCONTINUED | OUTPATIENT
Start: 2023-08-30 | End: 2023-08-31 | Stop reason: HOSPADM

## 2023-08-30 RX ORDER — MAGNESIUM SULFATE IN WATER 40 MG/ML
4000 INJECTION, SOLUTION INTRAVENOUS ONCE
Status: COMPLETED | OUTPATIENT
Start: 2023-08-30 | End: 2023-08-30

## 2023-08-30 RX ORDER — MIDAZOLAM HYDROCHLORIDE 5 MG/ML
INJECTION INTRAMUSCULAR; INTRAVENOUS
Status: COMPLETED
Start: 2023-08-30 | End: 2023-08-30

## 2023-08-30 RX ORDER — ACETAMINOPHEN 650 MG/1
650 SUPPOSITORY RECTAL EVERY 6 HOURS PRN
Status: DISCONTINUED | OUTPATIENT
Start: 2023-08-30 | End: 2023-08-31 | Stop reason: HOSPADM

## 2023-08-30 RX ORDER — ACETAMINOPHEN 325 MG/1
650 TABLET ORAL EVERY 6 HOURS PRN
Status: DISCONTINUED | OUTPATIENT
Start: 2023-08-30 | End: 2023-08-31 | Stop reason: HOSPADM

## 2023-08-30 RX ORDER — ESTRADIOL 1 MG/1
1 TABLET ORAL DAILY
COMMUNITY

## 2023-08-30 RX ORDER — POTASSIUM CHLORIDE 20 MEQ/1
40 TABLET, EXTENDED RELEASE ORAL ONCE
Status: COMPLETED | OUTPATIENT
Start: 2023-08-30 | End: 2023-08-30

## 2023-08-30 RX ORDER — SODIUM CHLORIDE 9 MG/ML
INJECTION, SOLUTION INTRAVENOUS PRN
Status: DISCONTINUED | OUTPATIENT
Start: 2023-08-30 | End: 2023-08-31 | Stop reason: HOSPADM

## 2023-08-30 RX ORDER — POTASSIUM CHLORIDE 7.45 MG/ML
10 INJECTION INTRAVENOUS
Status: COMPLETED | OUTPATIENT
Start: 2023-08-30 | End: 2023-08-31

## 2023-08-30 RX ORDER — METOPROLOL TARTRATE 5 MG/5ML
5 INJECTION INTRAVENOUS ONCE
Status: COMPLETED | OUTPATIENT
Start: 2023-08-30 | End: 2023-08-30

## 2023-08-30 RX ORDER — POTASSIUM CHLORIDE 7.45 MG/ML
10 INJECTION INTRAVENOUS
Status: DISPENSED | OUTPATIENT
Start: 2023-08-30 | End: 2023-08-30

## 2023-08-30 RX ORDER — SODIUM CHLORIDE 0.9 % (FLUSH) 0.9 %
5-40 SYRINGE (ML) INJECTION PRN
Status: DISCONTINUED | OUTPATIENT
Start: 2023-08-30 | End: 2023-08-31 | Stop reason: HOSPADM

## 2023-08-30 RX ORDER — 0.9 % SODIUM CHLORIDE 0.9 %
1000 INTRAVENOUS SOLUTION INTRAVENOUS ONCE
Status: COMPLETED | OUTPATIENT
Start: 2023-08-30 | End: 2023-08-30

## 2023-08-30 RX ORDER — MIDAZOLAM HYDROCHLORIDE 5 MG/ML
10 INJECTION INTRAMUSCULAR; INTRAVENOUS ONCE
Status: COMPLETED | OUTPATIENT
Start: 2023-08-30 | End: 2023-08-30

## 2023-08-30 RX ORDER — SODIUM CHLORIDE 0.9 % (FLUSH) 0.9 %
5-40 SYRINGE (ML) INJECTION EVERY 12 HOURS SCHEDULED
Status: DISCONTINUED | OUTPATIENT
Start: 2023-08-30 | End: 2023-08-31 | Stop reason: HOSPADM

## 2023-08-30 RX ORDER — ENOXAPARIN SODIUM 100 MG/ML
40 INJECTION SUBCUTANEOUS DAILY
Status: DISCONTINUED | OUTPATIENT
Start: 2023-08-31 | End: 2023-08-31 | Stop reason: HOSPADM

## 2023-08-30 RX ORDER — ONDANSETRON 4 MG/1
4 TABLET, ORALLY DISINTEGRATING ORAL EVERY 8 HOURS PRN
Status: DISCONTINUED | OUTPATIENT
Start: 2023-08-30 | End: 2023-08-31 | Stop reason: HOSPADM

## 2023-08-30 RX ORDER — ONDANSETRON 2 MG/ML
4 INJECTION INTRAMUSCULAR; INTRAVENOUS EVERY 6 HOURS PRN
Status: DISCONTINUED | OUTPATIENT
Start: 2023-08-30 | End: 2023-08-31 | Stop reason: HOSPADM

## 2023-08-30 RX ORDER — LISINOPRIL 20 MG/1
25 TABLET ORAL DAILY
COMMUNITY

## 2023-08-30 RX ADMIN — POTASSIUM CHLORIDE 40 MEQ: 1500 TABLET, EXTENDED RELEASE ORAL at 22:05

## 2023-08-30 RX ADMIN — POTASSIUM CHLORIDE 10 MEQ: 7.46 INJECTION, SOLUTION INTRAVENOUS at 22:15

## 2023-08-30 RX ADMIN — Medication 10 ML: at 22:12

## 2023-08-30 RX ADMIN — POTASSIUM CHLORIDE 10 MEQ: 7.46 INJECTION, SOLUTION INTRAVENOUS at 17:55

## 2023-08-30 RX ADMIN — MAGNESIUM SULFATE HEPTAHYDRATE 4000 MG: 40 INJECTION, SOLUTION INTRAVENOUS at 17:52

## 2023-08-30 RX ADMIN — SODIUM CHLORIDE 1000 ML: 9 INJECTION, SOLUTION INTRAVENOUS at 17:04

## 2023-08-30 RX ADMIN — METOPROLOL TARTRATE 5 MG: 5 INJECTION INTRAVENOUS at 17:40

## 2023-08-30 RX ADMIN — MIDAZOLAM HYDROCHLORIDE 10 MG: 5 INJECTION INTRAMUSCULAR; INTRAVENOUS at 17:12

## 2023-08-30 RX ADMIN — MIDAZOLAM HYDROCHLORIDE 10 MG: 5 INJECTION, SOLUTION INTRAMUSCULAR; INTRAVENOUS at 17:12

## 2023-08-30 RX ADMIN — POTASSIUM CHLORIDE 10 MEQ: 7.46 INJECTION, SOLUTION INTRAVENOUS at 23:07

## 2023-08-30 ASSESSMENT — PAIN - FUNCTIONAL ASSESSMENT: PAIN_FUNCTIONAL_ASSESSMENT: NONE - DENIES PAIN

## 2023-08-30 ASSESSMENT — ENCOUNTER SYMPTOMS
COUGH: 0
ABDOMINAL PAIN: 0
SHORTNESS OF BREATH: 0
CHEST TIGHTNESS: 0
BACK PAIN: 0
VOMITING: 0
DIARRHEA: 0
RHINORRHEA: 0

## 2023-08-30 NOTE — ED PROVIDER NOTES
Emergency Department Attending Physician Note  Location: [facility] [place] [address]  ED  8/30/2023       Pt Name: Annetta Gamez  MRN: [id number]  [facility] 1960    Date of evaluation: 8/30/2023  Provider: Jamie Rae DO  PCP: No primary care provider on file. Note Started: 4:41 PM EDT 8/30/23    CHIEF COMPLAINT  Chief Complaint   Patient presents with    Tachycardia     Pt was at nail salon and called EMS for chest heaviness and feeling like her heart was racing. Per squad patient was in SVT and they gave 6, 12, 12.adenosine with no relief and then shocked at 100j with 4 of versed prior to shock. HISTORY OF PRESENT ILLNESS:  History obtained by patient. Limitations to history : None. Annetta Gamez is a 61 y.o. female with no significant past medical history of SVT, presenting emergency department today by EMS after cardioversion, and did chemical cardioversion of SVT. Patient was getting her nails done for her son's wedding, when she felt chest pressure, on and felt like her heart was racing. She got really nauseated, and says she felt really poorly, and they called 911. She failed 6, 12, 12 mg of adenosine, and the decision to cardiovert was made. Got 4 mg of Versed, blood pressure always stable, lowest blood pressure was 150s. Cardioverted with 100 J, successfully, patient feels much better. She says she no longer really has any chest pressure. Says she feels almost back to normal, heart rate still in the low 100s here in the emergency department on my exam.  Denies any history of PE, DVT, and says otherwise she has been at the complete baseline of her health. No falls or injuries. No back pain or shortness of breath. Did have a diet Coke with lunch, but has never had anything like this in the past after caffeine. Nursing Notes were all reviewed and agreed with or any disagreements were addressed in the HPI.       MEDICAL HISTORY  Past Medical History:   Diagnosis Date inappropriate. If there are any questions or concerns please feel free to contact the dictating provider for clarification.      ADELE KAUFMAN DO (electronically signed)   [address] [place] [address] ,   08/30/23 [address] [place] [address]  08/30/23 7332

## 2023-08-30 NOTE — H&P
Hospital Medicine History & Physical      Date of Admission: 8/30/2023    Date of Service:  Pt seen/examined on 30 August     [x]Admitted to Inpatient with expected LOS greater than two midnights due to medical therapy. []Placed in Observation status. Chief Admission Complaint:  Tachycardia      Presenting Admission History:        61 y.o. female w/out prior hx of cardiac arrythmia who presented to [facility] after having an episode of palpitations/chest pressure w/ associated nausea. EMS was called and patient failed multiple rounds of Adenosine so was cardioverted w/ success in the ED w/ improvement in her subjective symptoms. Patient denies any history of PE, DVT, and says otherwise has felt well w/ no recent changes in her medications/medical hx. The patient denies any fever/chills or other signs/sxs of systemic illness or identifiable aggravating/alleviating factors. Assessment/Plan:      Current Principal Problem:  SVT (supraventricular tachycardia) (HCC)      SVT - s/p successful DCCV after failed Adenosine w/ post-procedure respiratory insufficiency likely 2nd to medication w/out evidence of acute respiratory failure. Cardiology consulted and appreciated. HypoKalemia - etiology clinically unable to determine. Follow serial labs and replace PRN. Reviewed and documented in this note. HypoMagnesemia - etiology clinically unable to determine. Follow serial labs and replace PRN. Reviewed and documented in this note. HTN - w/out known CAD and no evidence of active signs/sxs of ischemia/failure. Currently controlled on home meds w/ vitals documented and reviewed. ACEi held to make room for further Cardiac meds. HypoThyroid - clinically euthyroid on oral replacement therapy. Held, w/ outpt monitoring as previously arranged.        Discussed management of the case in detail w/ the Emergency Department Provider: Hamlet Villalpando    CXR: I have reviewed the CXR with the

## 2023-08-30 NOTE — ED NOTES
Non-rebreather applied due to drop in oxygen saturation. RT called.      Sheldon Parish RN  08/30/23 8900

## 2023-08-30 NOTE — ED NOTES
First attempt to cardiovert at 100j per Dr. Rk Guerrero.       Jerald Faulkner, RN  08/30/23 [address] [facility] 1600, RN  08/30/23 5077

## 2023-08-31 VITALS
BODY MASS INDEX: 26.37 KG/M2 | HEART RATE: 76 BPM | WEIGHT: 168 LBS | SYSTOLIC BLOOD PRESSURE: 165 MMHG | TEMPERATURE: 97.7 F | DIASTOLIC BLOOD PRESSURE: 83 MMHG | OXYGEN SATURATION: 100 % | RESPIRATION RATE: 16 BRPM | HEIGHT: 67 IN

## 2023-08-31 LAB
ALBUMIN SERPL-MCNC: 3.7 G/DL (ref 3.4–5)
ANION GAP SERPL CALCULATED.3IONS-SCNC: 9 MMOL/L (ref 3–16)
BUN SERPL-MCNC: 9 MG/DL (ref 7–20)
CALCIUM SERPL-MCNC: 8.3 MG/DL (ref 8.3–10.6)
CHLORIDE SERPL-SCNC: 108 MMOL/L (ref 99–110)
CO2 SERPL-SCNC: 21 MMOL/L (ref 21–32)
CREAT SERPL-MCNC: 0.7 MG/DL (ref 0.6–1.2)
DEPRECATED RDW RBC AUTO: 13.9 % (ref 12.4–15.4)
GFR SERPLBLD CREATININE-BSD FMLA CKD-EPI: >60 ML/MIN/{1.73_M2}
GLUCOSE SERPL-MCNC: 110 MG/DL (ref 70–99)
HCT VFR BLD AUTO: 36.1 % (ref 36–48)
HGB BLD-MCNC: 12.3 G/DL (ref 12–16)
MAGNESIUM SERPL-MCNC: 2.2 MG/DL (ref 1.8–2.4)
MCH RBC QN AUTO: 32.6 PG (ref 26–34)
MCHC RBC AUTO-ENTMCNC: 33.9 G/DL (ref 31–36)
MCV RBC AUTO: 96.2 FL (ref 80–100)
PHOSPHATE SERPL-MCNC: 2.6 MG/DL (ref 2.5–4.9)
PLATELET # BLD AUTO: 281 K/UL (ref 135–450)
PMV BLD AUTO: 8 FL (ref 5–10.5)
POTASSIUM SERPL-SCNC: 4.3 MMOL/L (ref 3.5–5.1)
RBC # BLD AUTO: 3.76 M/UL (ref 4–5.2)
SODIUM SERPL-SCNC: 138 MMOL/L (ref 136–145)
WBC # BLD AUTO: 8.1 K/UL (ref 4–11)

## 2023-08-31 PROCEDURE — 99223 1ST HOSP IP/OBS HIGH 75: CPT | Performed by: INTERNAL MEDICINE

## 2023-08-31 PROCEDURE — 6370000000 HC RX 637 (ALT 250 FOR IP): Performed by: INTERNAL MEDICINE

## 2023-08-31 PROCEDURE — 2580000003 HC RX 258: Performed by: INTERNAL MEDICINE

## 2023-08-31 PROCEDURE — 85027 COMPLETE CBC AUTOMATED: CPT

## 2023-08-31 PROCEDURE — 83735 ASSAY OF MAGNESIUM: CPT

## 2023-08-31 PROCEDURE — 80069 RENAL FUNCTION PANEL: CPT

## 2023-08-31 RX ORDER — ESTRADIOL 1 MG/1
1 TABLET ORAL DAILY
Status: DISCONTINUED | OUTPATIENT
Start: 2023-08-31 | End: 2023-08-31 | Stop reason: HOSPADM

## 2023-08-31 RX ORDER — PANTOPRAZOLE SODIUM 40 MG/1
40 TABLET, DELAYED RELEASE ORAL
Status: DISCONTINUED | OUTPATIENT
Start: 2023-09-01 | End: 2023-08-31 | Stop reason: HOSPADM

## 2023-08-31 RX ORDER — LISINOPRIL 20 MG/1
20 TABLET ORAL DAILY
Status: DISCONTINUED | OUTPATIENT
Start: 2023-08-31 | End: 2023-08-31 | Stop reason: HOSPADM

## 2023-08-31 RX ORDER — LEVOTHYROXINE SODIUM 0.05 MG/1
50 TABLET ORAL DAILY
Status: DISCONTINUED | OUTPATIENT
Start: 2023-08-31 | End: 2023-08-31 | Stop reason: HOSPADM

## 2023-08-31 RX ADMIN — LISINOPRIL 20 MG: 20 TABLET ORAL at 12:19

## 2023-08-31 RX ADMIN — LEVOTHYROXINE SODIUM 50 MCG: 0.05 TABLET ORAL at 10:37

## 2023-08-31 RX ADMIN — Medication 10 ML: at 08:25

## 2023-08-31 RX ADMIN — ESTRADIOL 1 MG: 1 TABLET ORAL at 10:37

## 2023-08-31 ASSESSMENT — ENCOUNTER SYMPTOMS
STRIDOR: 0
SHORTNESS OF BREATH: 1
RIGHT EYE: 0
WHEEZING: 0
HEMATOCHEZIA: 0
LEFT EYE: 0
HEMATEMESIS: 0

## 2023-08-31 NOTE — PLAN OF CARE
Problem: Discharge Planning  Goal: Discharge to home or other facility with appropriate resources  Outcome: Progressing     Problem: ABCDS Injury Assessment  Goal: Absence of physical injury  Outcome: Adequate for Discharge

## 2023-08-31 NOTE — PLAN OF CARE
Problem: Discharge Planning  Goal: Discharge to home or other facility with appropriate resources  8/31/2023 0542 by Morteza Flores RN  Outcome: Progressing     Problem: ABCDS Injury Assessment  Goal: Absence of physical injury  8/31/2023 1358 by Sole Conklin RN  Outcome: Adequate for Discharge  8/31/2023 0542 by Morteza Flores RN  Outcome: Adequate for Discharge

## 2023-08-31 NOTE — PROGRESS NOTES
EP notified nurse that they will not be doing anything inpatient. Perfect Serve sent to Sergio Eller MD to see if ok for discharge. Awaiting response/orders.

## 2023-08-31 NOTE — PROGRESS NOTES
Pt ok for discharge per MD. Discharge instructions given and reviewed. IV removed from left forearm. No questions or concerns at this time. Patient escorted out by  with all belongings. Tele removed CMU notified.

## 2023-08-31 NOTE — CARE COORDINATION
Chart reviewed. Patient from home with spouse. Has insurance with [employer]. Needs PCP list. IPTA. Here with SVT . S/P successful DCCV after failed adenosine. On RA. Will follow and assist with any discharge planning needs that arise and monitor clinical course for specialty recommendations.

## 2023-09-01 ENCOUNTER — TELEPHONE (OUTPATIENT)
Dept: CARDIOLOGY CLINIC | Age: 63
End: 2023-09-01

## 2023-09-01 NOTE — TELEPHONE ENCOUNTER
Spoke with patient regarding dates. Insurance will require 14 business day turnaround for authorization. Looking at scheduling 9/28. She has a golf tournament that weekend. Will it be ok for her to play? She knows she can't do any heavy lifting but unsure about golf. Insurance ID changed as of 9/1 to [id number] (from family plan to subscriber/spouse only). Showing ineligible today. Will recheck with new ID Tues.

## 2023-09-01 NOTE — TELEPHONE ENCOUNTER
Pt called the office back to schedule ablation. Informed pt that sarthak was unavailable at time of call and that she will call the pt as soon as she can.  Pt v/u

## 2023-09-01 NOTE — TELEPHONE ENCOUNTER
----- Message from Chang Padron MD sent at 8/31/2023 12:42 PM EDT -----  Regarding: SVT ablation to be scheduled  Hello. Saw this patient today as inpatient for SVT. She is agreeable to proceed with EP study and SVT ablation. Please contact her to schedule in September. Schedule with anesthesia, Ensite X and duration 4 hours. No medications to hold. BMP and CBC labs.     Thank you  Kate Valdez

## 2023-09-05 NOTE — TELEPHONE ENCOUNTER
Procedure:  SVT Ablation  Doctor:  Dr. Jaydon Ritter  Date:  9/14/23  Time:  8am  Arrival:  6:30am  Reps:  Ensite X  Anesthesia:  Yes      Spoke with patient. Insurance did not require auth. Please have patient arrive to the main entrance of [facility] ([address] [facility], [address] [place] [address]) and check in with the registration desk. They will be directed to the Cath Lab. Please call patient regarding medication instructions. Remind patient to be NPO after midnight (8 hours prior). Do not apply lotions/creams on skin the day of procedure.

## 2023-09-14 ENCOUNTER — HOSPITAL ENCOUNTER (INPATIENT)
Dept: CARDIAC CATH/INVASIVE PROCEDURES | Age: 63
LOS: 1 days | Discharge: HOME | End: 2023-09-15
Attending: INTERNAL MEDICINE | Admitting: INTERNAL MEDICINE
Payer: COMMERCIAL

## 2023-09-14 ENCOUNTER — ANESTHESIA (OUTPATIENT)
Dept: CARDIAC CATH/INVASIVE PROCEDURES | Age: 63
End: 2023-09-14
Payer: COMMERCIAL

## 2023-09-14 ENCOUNTER — ANESTHESIA EVENT (OUTPATIENT)
Dept: CARDIAC CATH/INVASIVE PROCEDURES | Age: 63
End: 2023-09-14
Payer: COMMERCIAL

## 2023-09-14 LAB
ABO + RH BLD: NORMAL
ANION GAP SERPL CALCULATED.3IONS-SCNC: 15 MMOL/L (ref 3–16)
BLD GP AB SCN SERPL QL: NORMAL
BUN SERPL-MCNC: 12 MG/DL (ref 7–20)
CALCIUM SERPL-MCNC: 9.7 MG/DL (ref 8.3–10.6)
CHLORIDE SERPL-SCNC: 104 MMOL/L (ref 99–110)
CO2 SERPL-SCNC: 23 MMOL/L (ref 21–32)
CREAT SERPL-MCNC: 0.6 MG/DL (ref 0.6–1.2)
DEPRECATED RDW RBC AUTO: 13.9 % (ref 12.4–15.4)
EKG ATRIAL RATE: 72 BPM
EKG ATRIAL RATE: 85 BPM
EKG DIAGNOSIS: NORMAL
EKG DIAGNOSIS: NORMAL
EKG P AXIS: 39 DEGREES
EKG P AXIS: 46 DEGREES
EKG P-R INTERVAL: 162 MS
EKG P-R INTERVAL: 166 MS
EKG Q-T INTERVAL: 398 MS
EKG Q-T INTERVAL: 420 MS
EKG QRS DURATION: 70 MS
EKG QRS DURATION: 78 MS
EKG QTC CALCULATION (BAZETT): 459 MS
EKG QTC CALCULATION (BAZETT): 473 MS
EKG R AXIS: -3 DEGREES
EKG R AXIS: 4 DEGREES
EKG T AXIS: 18 DEGREES
EKG T AXIS: 28 DEGREES
EKG VENTRICULAR RATE: 72 BPM
EKG VENTRICULAR RATE: 85 BPM
GFR SERPLBLD CREATININE-BSD FMLA CKD-EPI: >60 ML/MIN/{1.73_M2}
GLUCOSE SERPL-MCNC: 97 MG/DL (ref 70–99)
HCT VFR BLD AUTO: 37.4 % (ref 36–48)
HGB BLD-MCNC: 12.8 G/DL (ref 12–16)
MCH RBC QN AUTO: 32.5 PG (ref 26–34)
MCHC RBC AUTO-ENTMCNC: 34.2 G/DL (ref 31–36)
MCV RBC AUTO: 94.9 FL (ref 80–100)
PLATELET # BLD AUTO: 314 K/UL (ref 135–450)
PMV BLD AUTO: 7.7 FL (ref 5–10.5)
POTASSIUM SERPL-SCNC: 4.3 MMOL/L (ref 3.5–5.1)
POTASSIUM SERPL-SCNC: 4.3 MMOL/L (ref 3.5–5.1)
RBC # BLD AUTO: 3.94 M/UL (ref 4–5.2)
SODIUM SERPL-SCNC: 142 MMOL/L (ref 136–145)
WBC # BLD AUTO: 6.7 K/UL (ref 4–11)

## 2023-09-14 PROCEDURE — 2060000000 HC ICU INTERMEDIATE R&B

## 2023-09-14 PROCEDURE — 93623 PRGRMD STIMJ&PACG IV RX NFS: CPT | Performed by: INTERNAL MEDICINE

## 2023-09-14 PROCEDURE — 2580000003 HC RX 258: Performed by: NURSE ANESTHETIST, CERTIFIED REGISTERED

## 2023-09-14 PROCEDURE — 2709999900 HC NON-CHARGEABLE SUPPLY: Performed by: INTERNAL MEDICINE

## 2023-09-14 PROCEDURE — 93653 COMPRE EP EVAL TX SVT: CPT | Performed by: INTERNAL MEDICINE

## 2023-09-14 PROCEDURE — 86901 BLOOD TYPING SEROLOGIC RH(D): CPT

## 2023-09-14 PROCEDURE — 93010 ELECTROCARDIOGRAM REPORT: CPT | Performed by: INTERNAL MEDICINE

## 2023-09-14 PROCEDURE — 80048 BASIC METABOLIC PNL TOTAL CA: CPT

## 2023-09-14 PROCEDURE — 3700000001 HC ADD 15 MINUTES (ANESTHESIA): Performed by: ANESTHESIOLOGY

## 2023-09-14 PROCEDURE — 6360000002 HC RX W HCPCS

## 2023-09-14 PROCEDURE — 4A0234Z MEASUREMENT OF CARDIAC ELECTRICAL ACTIVITY, PERCUTANEOUS APPROACH: ICD-10-PCS | Performed by: INTERNAL MEDICINE

## 2023-09-14 PROCEDURE — 6360000002 HC RX W HCPCS: Performed by: NURSE ANESTHETIST, CERTIFIED REGISTERED

## 2023-09-14 PROCEDURE — 3700000000 HC ANESTHESIA ATTENDED CARE: Performed by: ANESTHESIOLOGY

## 2023-09-14 PROCEDURE — 2500000003 HC RX 250 WO HCPCS

## 2023-09-14 PROCEDURE — 93653 COMPRE EP EVAL TX SVT: CPT

## 2023-09-14 PROCEDURE — 02K83ZZ MAP CONDUCTION MECHANISM, PERCUTANEOUS APPROACH: ICD-10-PCS | Performed by: INTERNAL MEDICINE

## 2023-09-14 PROCEDURE — C1730 CATH, EP, 19 OR FEW ELECT: HCPCS | Performed by: INTERNAL MEDICINE

## 2023-09-14 PROCEDURE — 2500000003 HC RX 250 WO HCPCS: Performed by: NURSE ANESTHETIST, CERTIFIED REGISTERED

## 2023-09-14 PROCEDURE — 85027 COMPLETE CBC AUTOMATED: CPT

## 2023-09-14 PROCEDURE — 86850 RBC ANTIBODY SCREEN: CPT

## 2023-09-14 PROCEDURE — 93005 ELECTROCARDIOGRAM TRACING: CPT | Performed by: INTERNAL MEDICINE

## 2023-09-14 PROCEDURE — 86900 BLOOD TYPING SEROLOGIC ABO: CPT

## 2023-09-14 PROCEDURE — 2720000010 HC SURG SUPPLY STERILE: Performed by: INTERNAL MEDICINE

## 2023-09-14 PROCEDURE — C1893 INTRO/SHEATH, FIXED,NON-PEEL: HCPCS | Performed by: INTERNAL MEDICINE

## 2023-09-14 PROCEDURE — 4A023FZ MEASUREMENT OF CARDIAC RHYTHM, PERCUTANEOUS APPROACH: ICD-10-PCS | Performed by: INTERNAL MEDICINE

## 2023-09-14 PROCEDURE — C1733 CATH, EP, OTHR THAN COOL-TIP: HCPCS | Performed by: INTERNAL MEDICINE

## 2023-09-14 PROCEDURE — C1894 INTRO/SHEATH, NON-LASER: HCPCS | Performed by: INTERNAL MEDICINE

## 2023-09-14 PROCEDURE — 02583ZZ DESTRUCTION OF CONDUCTION MECHANISM, PERCUTANEOUS APPROACH: ICD-10-PCS | Performed by: INTERNAL MEDICINE

## 2023-09-14 RX ORDER — PROCHLORPERAZINE EDISYLATE 5 MG/ML
5 INJECTION INTRAMUSCULAR; INTRAVENOUS
OUTPATIENT
Start: 2023-09-14 | End: 2023-09-15

## 2023-09-14 RX ORDER — LEVOTHYROXINE SODIUM 0.05 MG/1
50 TABLET ORAL DAILY
Status: DISCONTINUED | OUTPATIENT
Start: 2023-09-14 | End: 2023-09-15 | Stop reason: HOSPADM

## 2023-09-14 RX ORDER — SODIUM CHLORIDE 9 MG/ML
INJECTION, SOLUTION INTRAVENOUS PRN
Status: DISCONTINUED | OUTPATIENT
Start: 2023-09-14 | End: 2023-09-15 | Stop reason: HOSPADM

## 2023-09-14 RX ORDER — LISINOPRIL 10 MG/1
20 TABLET ORAL DAILY
Status: DISCONTINUED | OUTPATIENT
Start: 2023-09-14 | End: 2023-09-15 | Stop reason: HOSPADM

## 2023-09-14 RX ORDER — SODIUM CHLORIDE 9 MG/ML
INJECTION, SOLUTION INTRAVENOUS PRN
OUTPATIENT
Start: 2023-09-14

## 2023-09-14 RX ORDER — SODIUM CHLORIDE 0.9 % (FLUSH) 0.9 %
5-40 SYRINGE (ML) INJECTION EVERY 12 HOURS SCHEDULED
Status: DISCONTINUED | OUTPATIENT
Start: 2023-09-14 | End: 2023-09-15 | Stop reason: HOSPADM

## 2023-09-14 RX ORDER — SODIUM CHLORIDE 9 MG/ML
INJECTION, SOLUTION INTRAVENOUS CONTINUOUS PRN
Status: DISCONTINUED | OUTPATIENT
Start: 2023-09-14 | End: 2023-09-14 | Stop reason: SDUPTHER

## 2023-09-14 RX ORDER — OXYCODONE HYDROCHLORIDE 5 MG/1
10 TABLET ORAL PRN
OUTPATIENT
Start: 2023-09-14

## 2023-09-14 RX ORDER — SODIUM CHLORIDE 0.9 % (FLUSH) 0.9 %
5-40 SYRINGE (ML) INJECTION PRN
OUTPATIENT
Start: 2023-09-14

## 2023-09-14 RX ORDER — PROPOFOL 10 MG/ML
INJECTION, EMULSION INTRAVENOUS CONTINUOUS PRN
Status: DISCONTINUED | OUTPATIENT
Start: 2023-09-14 | End: 2023-09-14 | Stop reason: SDUPTHER

## 2023-09-14 RX ORDER — ONDANSETRON 2 MG/ML
4 INJECTION INTRAMUSCULAR; INTRAVENOUS
OUTPATIENT
Start: 2023-09-14 | End: 2023-09-15

## 2023-09-14 RX ORDER — SODIUM CHLORIDE 0.9 % (FLUSH) 0.9 %
5-40 SYRINGE (ML) INJECTION PRN
Status: DISCONTINUED | OUTPATIENT
Start: 2023-09-14 | End: 2023-09-15 | Stop reason: HOSPADM

## 2023-09-14 RX ORDER — HYDROMORPHONE HYDROCHLORIDE 1 MG/ML
0.5 INJECTION, SOLUTION INTRAMUSCULAR; INTRAVENOUS; SUBCUTANEOUS EVERY 5 MIN PRN
OUTPATIENT
Start: 2023-09-14

## 2023-09-14 RX ORDER — HYDROMORPHONE HYDROCHLORIDE 1 MG/ML
0.25 INJECTION, SOLUTION INTRAMUSCULAR; INTRAVENOUS; SUBCUTANEOUS EVERY 5 MIN PRN
OUTPATIENT
Start: 2023-09-14

## 2023-09-14 RX ORDER — MEPERIDINE HYDROCHLORIDE 50 MG/ML
12.5 INJECTION INTRAMUSCULAR; INTRAVENOUS; SUBCUTANEOUS EVERY 5 MIN PRN
OUTPATIENT
Start: 2023-09-14

## 2023-09-14 RX ORDER — FENTANYL CITRATE 50 UG/ML
INJECTION, SOLUTION INTRAMUSCULAR; INTRAVENOUS PRN
Status: DISCONTINUED | OUTPATIENT
Start: 2023-09-14 | End: 2023-09-14 | Stop reason: SDUPTHER

## 2023-09-14 RX ORDER — IPRATROPIUM BROMIDE AND ALBUTEROL SULFATE 2.5; .5 MG/3ML; MG/3ML
1 SOLUTION RESPIRATORY (INHALATION)
OUTPATIENT
Start: 2023-09-14 | End: 2023-09-15

## 2023-09-14 RX ORDER — MIDAZOLAM HYDROCHLORIDE 1 MG/ML
INJECTION INTRAMUSCULAR; INTRAVENOUS PRN
Status: DISCONTINUED | OUTPATIENT
Start: 2023-09-14 | End: 2023-09-14 | Stop reason: SDUPTHER

## 2023-09-14 RX ORDER — SODIUM CHLORIDE 0.9 % (FLUSH) 0.9 %
5-40 SYRINGE (ML) INJECTION EVERY 12 HOURS SCHEDULED
OUTPATIENT
Start: 2023-09-14

## 2023-09-14 RX ORDER — LORAZEPAM 0.5 MG/1
0.5 TABLET ORAL
Status: ACTIVE | OUTPATIENT
Start: 2023-09-14 | End: 2023-09-15

## 2023-09-14 RX ORDER — PROPOFOL 10 MG/ML
INJECTION, EMULSION INTRAVENOUS PRN
Status: DISCONTINUED | OUTPATIENT
Start: 2023-09-14 | End: 2023-09-14 | Stop reason: SDUPTHER

## 2023-09-14 RX ORDER — ESTRADIOL 1 MG/1
2 TABLET ORAL DAILY
Status: DISCONTINUED | OUTPATIENT
Start: 2023-09-14 | End: 2023-09-14 | Stop reason: SDUPTHER

## 2023-09-14 RX ORDER — ONDANSETRON 2 MG/ML
4 INJECTION INTRAMUSCULAR; INTRAVENOUS EVERY 6 HOURS PRN
Status: DISCONTINUED | OUTPATIENT
Start: 2023-09-14 | End: 2023-09-15 | Stop reason: HOSPADM

## 2023-09-14 RX ORDER — ACETAMINOPHEN 325 MG/1
650 TABLET ORAL EVERY 4 HOURS PRN
Status: DISCONTINUED | OUTPATIENT
Start: 2023-09-14 | End: 2023-09-15 | Stop reason: HOSPADM

## 2023-09-14 RX ORDER — SODIUM CHLORIDE 9 MG/ML
INJECTION, SOLUTION INTRAVENOUS CONTINUOUS PRN
Status: DISCONTINUED | OUTPATIENT
Start: 2023-09-14 | End: 2023-09-15 | Stop reason: HOSPADM

## 2023-09-14 RX ORDER — LIDOCAINE HYDROCHLORIDE 20 MG/ML
INJECTION, SOLUTION INFILTRATION; PERINEURAL PRN
Status: DISCONTINUED | OUTPATIENT
Start: 2023-09-14 | End: 2023-09-14 | Stop reason: SDUPTHER

## 2023-09-14 RX ORDER — OXYCODONE HYDROCHLORIDE 5 MG/1
5 TABLET ORAL
OUTPATIENT
Start: 2023-09-14

## 2023-09-14 RX ORDER — ESTRADIOL 1 MG/1
1 TABLET ORAL DAILY
Status: DISCONTINUED | OUTPATIENT
Start: 2023-09-14 | End: 2023-09-15 | Stop reason: HOSPADM

## 2023-09-14 RX ADMIN — FENTANYL CITRATE 25 MCG: 50 INJECTION, SOLUTION INTRAMUSCULAR; INTRAVENOUS at 09:07

## 2023-09-14 RX ADMIN — MIDAZOLAM 2 MG: 1 INJECTION INTRAMUSCULAR; INTRAVENOUS at 08:44

## 2023-09-14 RX ADMIN — PROPOFOL 20 MG: 10 INJECTION, EMULSION INTRAVENOUS at 08:57

## 2023-09-14 RX ADMIN — LIDOCAINE HYDROCHLORIDE 60 MG: 20 INJECTION, SOLUTION INFILTRATION; PERINEURAL at 08:48

## 2023-09-14 RX ADMIN — PROPOFOL 20 MG: 10 INJECTION, EMULSION INTRAVENOUS at 09:02

## 2023-09-14 RX ADMIN — SODIUM CHLORIDE: 9 INJECTION, SOLUTION INTRAVENOUS at 08:43

## 2023-09-14 RX ADMIN — PROPOFOL 30 MG: 10 INJECTION, EMULSION INTRAVENOUS at 10:05

## 2023-09-14 RX ADMIN — SODIUM CHLORIDE 30 ML/HR: 9 INJECTION, SOLUTION INTRAVENOUS at 09:04

## 2023-09-14 RX ADMIN — PROPOFOL 20 MG: 10 INJECTION, EMULSION INTRAVENOUS at 09:07

## 2023-09-14 RX ADMIN — PROPOFOL 20 MG: 10 INJECTION, EMULSION INTRAVENOUS at 09:50

## 2023-09-14 RX ADMIN — FENTANYL CITRATE 25 MCG: 50 INJECTION, SOLUTION INTRAMUSCULAR; INTRAVENOUS at 10:02

## 2023-09-14 RX ADMIN — PROPOFOL 20 MG: 10 INJECTION, EMULSION INTRAVENOUS at 08:56

## 2023-09-14 RX ADMIN — PROPOFOL 20 MG: 10 INJECTION, EMULSION INTRAVENOUS at 09:43

## 2023-09-14 RX ADMIN — FENTANYL CITRATE 25 MCG: 50 INJECTION, SOLUTION INTRAMUSCULAR; INTRAVENOUS at 08:44

## 2023-09-14 RX ADMIN — FENTANYL CITRATE 25 MCG: 50 INJECTION, SOLUTION INTRAMUSCULAR; INTRAVENOUS at 10:00

## 2023-09-14 RX ADMIN — PROPOFOL 30 MG: 10 INJECTION, EMULSION INTRAVENOUS at 09:49

## 2023-09-14 RX ADMIN — PROPOFOL 125 MCG/KG/MIN: 10 INJECTION, EMULSION INTRAVENOUS at 08:48

## 2023-09-14 NOTE — PROGRESS NOTES
-Bilateral groin sites unremarkable, palpable pedal pulses- off bedrest since 1510   -dinner tray arrived  -sinus and vitals stable on room air. Walked to restroom, tolerated fine. Will cont to monitor.  Awaiting bed assignment

## 2023-09-14 NOTE — ANESTHESIA POSTPROCEDURE EVALUATION
Department of Anesthesiology  Postprocedure Note    Patient: July Montanez  MRN: [id number]  YOB: 1960  Date of evaluation: 9/14/2023      Procedure Summary     Date: 09/14/23 Room / Location: [facility] Cardiac Cath Lab    Anesthesia Start: 0838 Anesthesia Stop: 1104    Procedure: ABLATION Diagnosis:       Supraventricular tachycardia      SVT (supraventricular tachycardia) ([address])    Scheduled Providers: Karen Reyes MD Responsible Provider: Karen Reyes MD    Anesthesia Type: general ASA Status: 3          Anesthesia Type: No value filed.     Amairani Phase I:      Amairani Phase II:        Anesthesia Post Evaluation    Patient location during evaluation: PACU  Patient participation: complete - patient participated  Level of consciousness: awake and alert  Airway patency: patent  Nausea & Vomiting: no nausea and no vomiting  Complications: no  Cardiovascular status: hemodynamically stable  Respiratory status: acceptable  Hydration status: euvolemic  Pain management: adequate

## 2023-09-14 NOTE — PROGRESS NOTES
Patient admitted to room 428 from cath lab. Patient oriented to room, call light, bed rails, phone, lights and bathroom. Patient instructed about the schedule of the day including: vital sign frequency, lab draws, possible tests, frequency of MD and staff rounds, including RN/MD rounding together at bedside, daily weights, and I &O's. Patient instructed about prescribed diet, how to use 8MENU, and television bed alarm in place, patient aware of placement and reason. Telemetry box 109 in place, patient aware of placement and reason. Bed locked, in lowest position, side rails up 2/4, call light within reach. Will continue to monitor.

## 2023-09-14 NOTE — PROCEDURES
Procedure:  1) Comprehensive electrophysiology study  2) Arrhythmia induction  3) Drug infusion  4) Catheter mapping  5) 3D electro-anatomical mapping  6) Ablation of typical AVNRT    : Dorothy Boyer MD    Complications: none    Estimated blood loss: Minimal    Anesthesia: MAC    Medications used for induction/testing: isoproterenol     Other medications: None    Preoperative Diagnosis: SVT     Postoperative Diagnosis: typical AVNRT    Fluorsoscopy used: none    History: This is a 61 y.o. female with history of document symptomatic narrow complex tachycardia presenting for EP study and attempted ablation of SVT. Procedure in detail:  The patient was brought to the electrophysiology laboratory in stable condition. She was in a fasting, non-sedated state. The risks, benefits and alternatives of the procedure were discussed with the patient and her family in details. The risks including, but not limited to, vascular injury, bleeding, infection, radiation exposure, injury to cardiac and surrounding structures, injury to the normal conduction system of the heart, stroke, myocardial infarction and death were all discussed. The patient considered the various treatment options and decided to proceed with the EP study and attempted ablation procedure. Written informed consent was obtained and placed on the chart. A time-out protocol was completed to confirm the identity of the patient and the procedure to be performed. The patient was prepped and draped in a sterile fashion. Lidocaine/bupivicaine mixature was administered to the right and left groin. Using a modified Seldinger technique, venous access was obtained and sheaths were placed as detailed below. Catheters were then placed under 3D mapping system guidance as detailed below.     Sheath and Catheter Placement:         Sheath size Catheter type  Location    Right femoral vein:  8 F  Ablation  Right Atrium    Right femoral vein:  6 removal of the dilator and guidewire, an 4mm standard curve Blazer Ablation catheter was advanced into position along the ventricular septal aspect of the cavotricuspid isthmus under 3D mapping system guidance. Electroanatomical Mapping: An anatomic map of the right atrium was created during sinus rhythm using the [facility] [employer] 3D electro-anatomic mapping system. The map was marked for the location of the His Bundle electrogram, the coronary sinus ostium and the tricuspid annulus. Ablation  Following the diagnosis of typical atrioventricular jeannie reentrant tachycardia, the 4mm Saphire large curl ablation catheter was advanced into position along the septal aspect of the tricuspid valve under 3D mapping system guidance. Electrophysiologic mapping was performed to localize an area on the anterior lip of the coronary sinus ostium where an atrial-ventricular ratio of approximately 1:3 could be obtained. Following identification of this area, a lesion was delivered (40 W, 60 C, 60 seconds) with demonstration of occasional junctional beats. A second lesion was delivered just posterior and septal to the previous lesion with demonstration of accelerated junctional rhythm    Drug Infusion and Reinduction:  Following ablation, programmed stimulation was performed on and off of isoproterenol infusion with demonstration of no sustained tachyarrhythmias or double AV jeannie echo beats in contrast to prior to the ablation. Following an appropriate waiting period during which success of the ablation was confirmed, catheters were removed. Sheaths were removed and hemostasis achieved with manual compression. The patient was transported to the holding area in stable condition. Summary:  Comprehensive electrophysiologic study with induction of typical AVNRT. Successful ablation of typical AVNRT. Recommendations:  1. Bedrest x 4 hours with legs straight  2. Monitor on telemetry  3.  ECG upon arrival and in

## 2023-09-15 VITALS
HEART RATE: 65 BPM | SYSTOLIC BLOOD PRESSURE: 149 MMHG | WEIGHT: 171.9 LBS | RESPIRATION RATE: 16 BRPM | DIASTOLIC BLOOD PRESSURE: 88 MMHG | HEIGHT: 67 IN | OXYGEN SATURATION: 99 % | TEMPERATURE: 98.2 F | BODY MASS INDEX: 26.98 KG/M2

## 2023-09-15 LAB
ANION GAP SERPL CALCULATED.3IONS-SCNC: 11 MMOL/L (ref 3–16)
BUN SERPL-MCNC: 8 MG/DL (ref 7–20)
CALCIUM SERPL-MCNC: 8.6 MG/DL (ref 8.3–10.6)
CHLORIDE SERPL-SCNC: 105 MMOL/L (ref 99–110)
CO2 SERPL-SCNC: 24 MMOL/L (ref 21–32)
CREAT SERPL-MCNC: 0.7 MG/DL (ref 0.6–1.2)
DEPRECATED RDW RBC AUTO: 13.9 % (ref 12.4–15.4)
EKG ATRIAL RATE: 70 BPM
EKG DIAGNOSIS: NORMAL
EKG P AXIS: 59 DEGREES
EKG P-R INTERVAL: 140 MS
EKG Q-T INTERVAL: 448 MS
EKG QRS DURATION: 76 MS
EKG QTC CALCULATION (BAZETT): 483 MS
EKG R AXIS: 57 DEGREES
EKG T AXIS: 49 DEGREES
EKG VENTRICULAR RATE: 70 BPM
GFR SERPLBLD CREATININE-BSD FMLA CKD-EPI: >60 ML/MIN/{1.73_M2}
GLUCOSE SERPL-MCNC: 103 MG/DL (ref 70–99)
HCT VFR BLD AUTO: 34.6 % (ref 36–48)
HGB BLD-MCNC: 11.8 G/DL (ref 12–16)
MCH RBC QN AUTO: 32.5 PG (ref 26–34)
MCHC RBC AUTO-ENTMCNC: 34.2 G/DL (ref 31–36)
MCV RBC AUTO: 95.2 FL (ref 80–100)
PLATELET # BLD AUTO: 249 K/UL (ref 135–450)
PMV BLD AUTO: 8.1 FL (ref 5–10.5)
POTASSIUM SERPL-SCNC: 3.7 MMOL/L (ref 3.5–5.1)
RBC # BLD AUTO: 3.64 M/UL (ref 4–5.2)
SODIUM SERPL-SCNC: 140 MMOL/L (ref 136–145)
WBC # BLD AUTO: 7 K/UL (ref 4–11)

## 2023-09-15 PROCEDURE — 99239 HOSP IP/OBS DSCHRG MGMT >30: CPT

## 2023-09-15 PROCEDURE — 36415 COLL VENOUS BLD VENIPUNCTURE: CPT

## 2023-09-15 PROCEDURE — 85027 COMPLETE CBC AUTOMATED: CPT

## 2023-09-15 PROCEDURE — 80048 BASIC METABOLIC PNL TOTAL CA: CPT

## 2023-09-15 PROCEDURE — 6370000000 HC RX 637 (ALT 250 FOR IP): Performed by: INTERNAL MEDICINE

## 2023-09-15 RX ADMIN — LISINOPRIL 20 MG: 10 TABLET ORAL at 07:48

## 2023-09-15 RX ADMIN — LEVOTHYROXINE SODIUM 50 MCG: 0.05 TABLET ORAL at 05:20

## 2023-09-15 RX ADMIN — ESTRADIOL 1 MG: 1 TABLET ORAL at 07:48

## 2023-09-15 ASSESSMENT — PAIN SCALES - GENERAL: PAINLEVEL_OUTOF10: 0

## 2023-09-15 NOTE — PROGRESS NOTES
DC instructions gone over with pt and her  and all questions answered, pt to dc home no needs and to f/u in 6 weeks

## 2023-09-15 NOTE — H&P
I have reviewed seen, interviewed and examined the patient. There has been no change in the findings since our last hospital H&P/consult encounter dated 8/31/2023.

## 2023-09-21 LAB
EKG ATRIAL RATE: 70 BPM
EKG DIAGNOSIS: NORMAL
EKG P AXIS: 59 DEGREES
EKG P-R INTERVAL: 140 MS
EKG Q-T INTERVAL: 448 MS
EKG QRS DURATION: 76 MS
EKG QTC CALCULATION (BAZETT): 483 MS
EKG R AXIS: 57 DEGREES
EKG T AXIS: 49 DEGREES
EKG VENTRICULAR RATE: 70 BPM

## 2023-11-01 ENCOUNTER — OFFICE VISIT (OUTPATIENT)
Dept: CARDIOLOGY CLINIC | Age: 63
End: 2023-11-01
Payer: COMMERCIAL

## 2023-11-01 VITALS
HEART RATE: 72 BPM | HEIGHT: 67 IN | BODY MASS INDEX: 27.62 KG/M2 | SYSTOLIC BLOOD PRESSURE: 142 MMHG | OXYGEN SATURATION: 100 % | DIASTOLIC BLOOD PRESSURE: 84 MMHG | WEIGHT: 176 LBS

## 2023-11-01 DIAGNOSIS — Z98.890 STATUS POST CATHETER ABLATION OF SLOW PATHWAY: ICD-10-CM

## 2023-11-01 DIAGNOSIS — I47.10 SVT (SUPRAVENTRICULAR TACHYCARDIA): Primary | ICD-10-CM

## 2023-11-01 DIAGNOSIS — R00.2 PALPITATIONS: ICD-10-CM

## 2023-11-01 DIAGNOSIS — Z86.79 STATUS POST CATHETER ABLATION OF SLOW PATHWAY: ICD-10-CM

## 2023-11-01 DIAGNOSIS — I10 PRIMARY HYPERTENSION: ICD-10-CM

## 2023-11-01 PROCEDURE — 99214 OFFICE O/P EST MOD 30 MIN: CPT | Performed by: INTERNAL MEDICINE

## 2023-11-01 PROCEDURE — 93000 ELECTROCARDIOGRAM COMPLETE: CPT | Performed by: INTERNAL MEDICINE

## 2023-11-01 PROCEDURE — 3077F SYST BP >= 140 MM HG: CPT | Performed by: INTERNAL MEDICINE

## 2023-11-01 PROCEDURE — 3079F DIAST BP 80-89 MM HG: CPT | Performed by: INTERNAL MEDICINE

## 2023-11-01 RX ORDER — IBUPROFEN 200 MG
200 TABLET ORAL EVERY 6 HOURS PRN
COMMUNITY

## 2023-11-01 RX ORDER — LISINOPRIL 10 MG/1
10 TABLET ORAL DAILY
COMMUNITY
Start: 2023-09-01

## 2023-11-01 ASSESSMENT — ENCOUNTER SYMPTOMS
RIGHT EYE: 0
HEMATOCHEZIA: 0
WHEEZING: 0
LEFT EYE: 0
SHORTNESS OF BREATH: 0
STRIDOR: 0
HEMATEMESIS: 0

## 2023-11-01 NOTE — PROGRESS NOTES
Assessment:     1. SVT: Patient had recurrent palpitation episodes for the prior at least 2 years. Most prior episodes were relatively short lasting only up to 30 minutes in duration with mild associated symptoms. Presented in August 2023 with sudden onset of rapid palpitations associated with dizziness chest pressure and shortness of breath. Found by EMS to have SVT at 240 bpm.  Failed multiple adenosine injections. Eventually was defibrillated in the field with 100 J successfully. Resting sinus ECG was without evidence of ventricular preexcitation or other abnormalities. 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 She underwent an Ep study on 9/14/2023 with finding, and successful ablation, of typical AVNRT. There were no procedural complications. She was discharged home in good condition. She is not on any AV jeannie blockers and has not had any further episodes of palpitations. She is doing well. Groin access sites have healed properly. No issues on today's ECG. 2. Hypertension: elevated blood pressure today. Continue lisinopril, follow up with PCP and monitor clinically. Plan:     No changes made today. Follow up with me as needed. Subjective:     Patient ID: Samir Rahman is a 61 y.o. female. Chief Complaint:  Chief Complaint   Patient presents with    Follow-up     S/p ablation       HPI    Patient is a pleasant 61 y.o. female who presents for evaluation of SVT. The patient has a past medical history of hypertension, hypothyroidism, and SVT. On 09/14/2023 she was admitted and underwent SVT ablation due to repeat episode of SVT. Office Visit ([id number] [facility], 11/01/2023): She presents today for follow up post ablation. She reports that she feels well overall. Patient denies current edema, chest pain, shortness of breath, palpitations, dizziness or syncope. She is not taking any cardiac medications currently.        Review of Systems  Review of Systems   Constitutional: